# Patient Record
Sex: FEMALE | Race: WHITE | NOT HISPANIC OR LATINO | URBAN - METROPOLITAN AREA
[De-identification: names, ages, dates, MRNs, and addresses within clinical notes are randomized per-mention and may not be internally consistent; named-entity substitution may affect disease eponyms.]

---

## 2021-11-03 ENCOUNTER — OFFICE VISIT (OUTPATIENT)
Dept: URBAN - METROPOLITAN AREA CLINIC 33 | Facility: CLINIC | Age: 36
End: 2021-11-03

## 2021-11-03 VITALS
SYSTOLIC BLOOD PRESSURE: 100 MMHG | HEIGHT: 67 IN | HEART RATE: 90 BPM | DIASTOLIC BLOOD PRESSURE: 62 MMHG | BODY MASS INDEX: 22.98 KG/M2 | OXYGEN SATURATION: 99 % | WEIGHT: 146.4 LBS

## 2021-11-03 RX ORDER — LIOTHYRONINE SODIUM 5 MCG
1 TABLET ON AN EMPTY STOMACH TABLET ORAL ONCE A DAY
Qty: 30 | Status: ACTIVE | COMMUNITY

## 2021-11-03 RX ORDER — PEGINTERFERON ALFA-2A 180 UG/ML
1 ML INJECTION, SOLUTION SUBCUTANEOUS
Qty: 4 | Status: ACTIVE | COMMUNITY

## 2021-11-03 RX ORDER — LEVOTHYROXINE SODIUM 112 UG/1
1 TABLET IN THE MORNING ON AN EMPTY STOMACH TABLET ORAL ONCE A DAY
Qty: 30 | Status: ACTIVE | COMMUNITY

## 2021-11-03 RX ORDER — ASPIRIN 81 MG/1
1 TABLET TABLET, COATED ORAL ONCE A DAY
Qty: 30 | Status: ACTIVE | COMMUNITY

## 2021-11-03 NOTE — EXAM-MIGRATED EXAMINATIONS
General Examination : Medicla assistant was in room.;     GENERAL APPEARANCE: - alert, in no acute distress, well developed, well nourished;   ORAL CAVITY: - mucosa moist;   THROAT: - clear;   HEART: - no murmurs, regular rate and rhythm, S1, S2 normal;   LUNGS: - clear to auscultation bilaterally, good air movement, no wheezes, rales, rhonchi;   ABDOMEN: - bowel sounds present, no masses palpable, no organomegaly , no rebound tenderness, soft, nontender, nondistended;

## 2021-11-03 NOTE — HPI-MIGRATED HPI
;     Bloating : This is a 36 year old female patient complains of bloating/gas. She states symptoms started after taking Pegasys 67mcg. Patient admits symptoms are more present in the afternoon. Patient admits symptoms have been present for aabout 7 months. She has tried Tums and simethicone to help with her symptoms. Patient admits taking any OTC medications to relieve symptoms.  Patient denies having any previous breath test including H. Pylori or SIBO.;

## 2021-12-08 ENCOUNTER — OFFICE VISIT (OUTPATIENT)
Dept: URBAN - METROPOLITAN AREA CLINIC 33 | Facility: CLINIC | Age: 36
End: 2021-12-08
Payer: COMMERCIAL

## 2021-12-08 VITALS
DIASTOLIC BLOOD PRESSURE: 72 MMHG | SYSTOLIC BLOOD PRESSURE: 108 MMHG | BODY MASS INDEX: 23.23 KG/M2 | HEIGHT: 67 IN | WEIGHT: 148 LBS

## 2021-12-08 DIAGNOSIS — R10.84 GENERALIZED ABDOMINAL PAIN: ICD-10-CM

## 2021-12-08 DIAGNOSIS — R14.3 FLATULENCE: ICD-10-CM

## 2021-12-08 PROBLEM — 249504006 FLATULENCE: Status: ACTIVE | Noted: 2021-11-03

## 2021-12-08 PROBLEM — 102614006 GENERALIZED ABDOMINAL PAIN: Status: ACTIVE | Noted: 2021-11-03

## 2021-12-08 PROCEDURE — 99213 OFFICE O/P EST LOW 20 MIN: CPT | Performed by: INTERNAL MEDICINE

## 2021-12-08 RX ORDER — LIOTHYRONINE SODIUM 5 MCG
1 TABLET ON AN EMPTY STOMACH TABLET ORAL ONCE A DAY
Qty: 30 | Status: ACTIVE | COMMUNITY

## 2021-12-08 RX ORDER — LEVOTHYROXINE SODIUM 112 UG/1
1 TABLET IN THE MORNING ON AN EMPTY STOMACH TABLET ORAL ONCE A DAY
Qty: 30 | Status: ACTIVE | COMMUNITY

## 2021-12-08 RX ORDER — ASPIRIN 81 MG/1
1 TABLET TABLET, COATED ORAL ONCE A DAY
Qty: 30 | Status: ACTIVE | COMMUNITY

## 2021-12-08 RX ORDER — PEGINTERFERON ALFA-2A 180 UG/ML
1 ML INJECTION, SOLUTION SUBCUTANEOUS
Qty: 4 | Status: ACTIVE | COMMUNITY

## 2021-12-08 RX ORDER — RIFAXIMIN 550 MG/1
1 TABLET TABLET ORAL THREE TIMES A DAY
Qty: 42 TABLET | Refills: 0 | OUTPATIENT
Start: 2021-12-08 | End: 2021-12-22

## 2021-12-08 NOTE — HPI-BLOATING/GAS
BLOATING: Patient presents for follow up of bloating , u/s and SIBO test . Patient had both lab and imaging completed with results noted . Patient denies improvement since last visit . Patient SIBO test did report back positive . Patient admits  taking Gas X as needed for relief  without true relief .   Patient denies nausea or vomiting .   Of last visit (11/03/2021)  This is a 36 year old female patient complains of bloating/gas. She states symptoms started after taking Pegasys 67mcg. Patient admits symptoms are more present in the afternoon. Patient admits symptoms have been present for about 7 months. She has tried Tums and simethicone to help with her symptoms. Patient admits taking any OTC medications to relieve symptoms.  Patient denies having any previous breath test including H. Pylori or SIBO.;

## 2022-02-09 ENCOUNTER — DASHBOARD ENCOUNTERS (OUTPATIENT)
Age: 37
End: 2022-02-09

## 2022-02-09 ENCOUNTER — OFFICE VISIT (OUTPATIENT)
Dept: URBAN - METROPOLITAN AREA CLINIC 33 | Facility: CLINIC | Age: 37
End: 2022-02-09
Payer: COMMERCIAL

## 2022-02-09 VITALS
SYSTOLIC BLOOD PRESSURE: 108 MMHG | HEIGHT: 67 IN | BODY MASS INDEX: 23.7 KG/M2 | WEIGHT: 151 LBS | HEART RATE: 69 BPM | OXYGEN SATURATION: 98 % | DIASTOLIC BLOOD PRESSURE: 72 MMHG

## 2022-02-09 DIAGNOSIS — R10.84 GENERALIZED ABDOMINAL PAIN: ICD-10-CM

## 2022-02-09 DIAGNOSIS — R14.3 FLATULENCE: ICD-10-CM

## 2022-02-09 PROCEDURE — 99213 OFFICE O/P EST LOW 20 MIN: CPT | Performed by: INTERNAL MEDICINE

## 2022-02-09 RX ORDER — PEGINTERFERON ALFA-2A 180 UG/ML
1 ML INJECTION, SOLUTION SUBCUTANEOUS
Qty: 4 | Status: ACTIVE | COMMUNITY

## 2022-02-09 RX ORDER — LIOTHYRONINE SODIUM 5 MCG
1 TABLET ON AN EMPTY STOMACH TABLET ORAL ONCE A DAY
Qty: 30 | Status: ACTIVE | COMMUNITY

## 2022-02-09 RX ORDER — ASPIRIN 81 MG/1
1 TABLET TABLET, COATED ORAL ONCE A DAY
Qty: 30 | Status: ACTIVE | COMMUNITY

## 2022-02-09 RX ORDER — LEVOTHYROXINE SODIUM 112 UG/1
1 TABLET IN THE MORNING ON AN EMPTY STOMACH TABLET ORAL ONCE A DAY
Qty: 30 | Status: ACTIVE | COMMUNITY

## 2022-02-09 NOTE — HPI-BLOATING/GAS
Patient presents for follow up for bloating and gas. She admits  taking/ Completing  prescribed course of Xifaxan 550mg since last visit . She admits improvement of symptoms during and for a short period after completing course. She admits she feels as though symptoms have returned . She denies changing her diet which may have aggravated symtptoms  .  She states symptoms increased througout the day . She admits afternoons and evenings have more symptoms .   She denies belching but admits flatulance . She admits she feels stuffed (overly full)     Of last visit (12/08/21)  Patient presents for follow up of bloating , u/s and SIBO test . Patient had both lab and imaging completed with results noted . Patient denies improvement since last visit . Patient SIBO test did report back positive . Patient admits  taking Gas X as needed for relief  without true relief .   Patient denies nausea or vomiting .   Of last visit (11/03/2021)  This is a 36 year old female patient complains of bloating/gas. She states symptoms started after taking Pegasys 67mcg. Patient admits symptoms are more present in the afternoon. Patient admits symptoms have been present for about 7 months. She has tried Tums and simethicone to help with her symptoms. Patient admits taking any OTC medications to relieve symptoms.  Patient denies having any previous breath test including H. Pylori or SIBO.;

## 2022-03-14 ENCOUNTER — OFFICE VISIT (OUTPATIENT)
Dept: URBAN - METROPOLITAN AREA CLINIC 33 | Facility: CLINIC | Age: 37
End: 2022-03-14